# Patient Record
Sex: MALE | Race: WHITE
[De-identification: names, ages, dates, MRNs, and addresses within clinical notes are randomized per-mention and may not be internally consistent; named-entity substitution may affect disease eponyms.]

---

## 2018-10-12 ENCOUNTER — HOSPITAL ENCOUNTER (EMERGENCY)
Dept: HOSPITAL 62 - ER | Age: 38
LOS: 1 days | Discharge: HOME | End: 2018-10-13
Payer: COMMERCIAL

## 2018-10-12 DIAGNOSIS — R07.89: Primary | ICD-10-CM

## 2018-10-12 PROCEDURE — 82553 CREATINE MB FRACTION: CPT

## 2018-10-12 PROCEDURE — 82550 ASSAY OF CK (CPK): CPT

## 2018-10-12 PROCEDURE — 71045 X-RAY EXAM CHEST 1 VIEW: CPT

## 2018-10-12 PROCEDURE — 36415 COLL VENOUS BLD VENIPUNCTURE: CPT

## 2018-10-12 PROCEDURE — 84484 ASSAY OF TROPONIN QUANT: CPT

## 2018-10-12 PROCEDURE — 99285 EMERGENCY DEPT VISIT HI MDM: CPT

## 2018-10-12 PROCEDURE — 93010 ELECTROCARDIOGRAM REPORT: CPT

## 2018-10-12 PROCEDURE — 93005 ELECTROCARDIOGRAM TRACING: CPT

## 2018-10-12 PROCEDURE — 80053 COMPREHEN METABOLIC PANEL: CPT

## 2018-10-12 PROCEDURE — 85025 COMPLETE CBC W/AUTO DIFF WBC: CPT

## 2018-10-13 VITALS — SYSTOLIC BLOOD PRESSURE: 108 MMHG | DIASTOLIC BLOOD PRESSURE: 79 MMHG

## 2018-10-13 LAB
ADD MANUAL DIFF: NO
ALBUMIN SERPL-MCNC: 4.5 G/DL (ref 3.5–5)
ALP SERPL-CCNC: 60 U/L (ref 38–126)
ALT SERPL-CCNC: 59 U/L (ref 21–72)
ANION GAP SERPL CALC-SCNC: 11 MMOL/L (ref 5–19)
AST SERPL-CCNC: 37 U/L (ref 17–59)
BASOPHILS # BLD AUTO: 0 10^3/UL (ref 0–0.2)
BASOPHILS NFR BLD AUTO: 0.6 % (ref 0–2)
BILIRUB DIRECT SERPL-MCNC: 0.3 MG/DL (ref 0–0.4)
BILIRUB SERPL-MCNC: 0.6 MG/DL (ref 0.2–1.3)
BUN SERPL-MCNC: 13 MG/DL (ref 7–20)
CALCIUM: 9.6 MG/DL (ref 8.4–10.2)
CHLORIDE SERPL-SCNC: 106 MMOL/L (ref 98–107)
CK MB SERPL-MCNC: 0.55 NG/ML (ref ?–4.55)
CK SERPL-CCNC: 80 U/L (ref 55–170)
CO2 SERPL-SCNC: 23 MMOL/L (ref 22–30)
EOSINOPHIL # BLD AUTO: 0.1 10^3/UL (ref 0–0.6)
EOSINOPHIL NFR BLD AUTO: 2.2 % (ref 0–6)
ERYTHROCYTE [DISTWIDTH] IN BLOOD BY AUTOMATED COUNT: 13 % (ref 11.5–14)
GLUCOSE SERPL-MCNC: 101 MG/DL (ref 75–110)
HCT VFR BLD CALC: 46.1 % (ref 37.9–51)
HGB BLD-MCNC: 16.4 G/DL (ref 13.5–17)
LYMPHOCYTES # BLD AUTO: 1.7 10^3/UL (ref 0.5–4.7)
LYMPHOCYTES NFR BLD AUTO: 27.8 % (ref 13–45)
MCH RBC QN AUTO: 30.4 PG (ref 27–33.4)
MCHC RBC AUTO-ENTMCNC: 35.6 G/DL (ref 32–36)
MCV RBC AUTO: 85 FL (ref 80–97)
MONOCYTES # BLD AUTO: 0.6 10^3/UL (ref 0.1–1.4)
MONOCYTES NFR BLD AUTO: 10.4 % (ref 3–13)
NEUTROPHILS # BLD AUTO: 3.6 10^3/UL (ref 1.7–8.2)
NEUTS SEG NFR BLD AUTO: 59 % (ref 42–78)
PLATELET # BLD: 261 10^3/UL (ref 150–450)
POTASSIUM SERPL-SCNC: 4.1 MMOL/L (ref 3.6–5)
PROT SERPL-MCNC: 7.4 G/DL (ref 6.3–8.2)
RBC # BLD AUTO: 5.4 10^6/UL (ref 4.35–5.55)
SODIUM SERPL-SCNC: 140.3 MMOL/L (ref 137–145)
TOTAL CELLS COUNTED % (AUTO): 100 %
TROPONIN I SERPL-MCNC: < 0.012 NG/ML
WBC # BLD AUTO: 6.1 10^3/UL (ref 4–10.5)

## 2018-10-13 NOTE — RADIOLOGY REPORT (SQ)
EXAM DESCRIPTION: 



XR CHEST 1 VIEW



COMPLETED DATE/TME:  10/13/2018 00:11



CLINICAL HISTORY: chest pain



COMPARISON: None.



FINDINGS: Single frontal view of the chest.  The

cardiomediastinal silhouette has normal size and contour. No

consolidation, pneumothorax, or pleural effusion.  Leads overlie

the chest. Postoperative change of the distal clavicle.  Upper

abdominal soft tissues are unremarkable.



IMPRESSION:



1. No acute pulmonary process identified.

## 2018-10-13 NOTE — ER DOCUMENT REPORT
ED General





- General


Chief Complaint: Chest Pain


Stated Complaint: CHEST PAINS


Time Seen by Provider: 10/12/18 23:58


Mode of Arrival: Ambulatory


Information source: Patient


Notes: 





Patient is a 37-year-old male who presents with chief complaint of chest 

pressure.  Patient reports the pain started this morning, is located at the 

bottom of his sternum and feels like a squeezing sensation.  Patient denies any 

shortness of breath, nausea, radiation of the pain/pressure and denies any 

diaphoresis.  Denies any alleviating or exacerbating symptoms.  Denies any 

cardiac history.  Patient is not a smoker.  Patient does report that he 

recently started working out at the gym approximately 3 weeks ago other than 

that patient has had no change in his daily routines.








TRAVEL OUTSIDE OF THE [place] IN LAST 30 DAYS: No





- Related Data


Allergies/Adverse Reactions: 


 





No Known Allergies Allergy (Verified 10/12/18 21:34)


 











Past Medical History





- General


Information source: Patient





- Social History


Smoking Status: Never Smoker


Chew tobacco use (# tins/day): No


Frequency of alcohol use: Social


Drug Abuse: None


Family History: Reviewed & Not Pertinent


Patient has suicidal ideation: No


Patient has homicidal ideation: No


Renal/ Medical History: Denies: Hx Peritoneal Dialysis


GI Medical History: Reports: Hx Ulcer


Past Surgical History: Reports: Hx Appendectomy, Hx Tonsillectomy





- Immunizations


Immunizations up to date: Yes





Review of Systems





- Review of Systems


Cardiovascular: Chest pain


-: Yes All other systems reviewed and negative





Physical Exam





- Vital signs


Vitals: 


 











Temp Pulse Resp BP Pulse Ox


 


 98.6 F   81   20   132/93 H  94 


 


 10/12/18 21:52  10/12/18 21:52  10/12/18 21:52  10/12/18 21:52  10/12/18 21:52














- Notes


Notes: 





PHYSICAL EXAMINATION:





GENERAL: Well-appearing, well-nourished and in no acute distress.





HEAD: Atraumatic, normocephalic.





EYES: Pupils equal round and reactive to light, extraocular movements intact, 

sclera anicteric, conjunctiva are normal.





ENT: Nares patent, oropharynx clear without exudates.  Moist mucous membranes.





NECK: Normal range of motion, supple without lymphadenopathy





LUNGS: Breath sounds clear to auscultation bilaterally and equal.  No wheezes 

rales or rhonchi.





HEART: Regular rate and rhythm without murmurs





ABDOMEN: Soft, nontender, nondistended abdomen.  No guarding, no rebound.  No 

masses appreciated.





Musculoskeletal: Normal range of motion, no pitting or edema.  No cyanosis.  

Tenderness to palpation of the lower tip of the sternum.





NEUROLOGICAL: Cranial nerves grossly intact.  Normal speech, normal gait.  

Normal sensory, motor exams 





PSYCH: Normal mood, normal affect.





SKIN: Warm, Dry, normal turgor, no rashes or lesions noted.





Course





- Re-evaluation


Re-evalutation: 





CBC, CMP and cardiac enzymes are negative.  EKG is a sinus rhythm, normal axis 

with no ST segment elevations or depressions.  Chest x-ray is unremarkable.  

Patient's symptoms are consistent with musculoskeletal strain versus GERD.  

Unlikely this is ACS due to negative troponin, heart score of 1, negative 

troponin despite pain starting over 12 hours ago.  Patient was given a GI 

cocktail and reports complete relief of his symptoms.  Patient requesting 

discharge home at this time.  Patient given ED return precautions.  Patient 

verbalizes understanding of same.





- Vital Signs


Vital signs: 


 











Temp Pulse Resp BP Pulse Ox


 


 98.6 F   81   16   108/79   96 


 


 10/12/18 21:52  10/12/18 21:52  10/13/18 03:01  10/13/18 03:01  10/13/18 03:01














- Laboratory


Result Diagrams: 


 10/12/18 23:17





 10/12/18 23:17





Discharge





- Discharge


Clinical Impression: 


Chest pain


Qualifiers:


 Chest pain type: unspecified Qualified Code(s): R07.9 - Chest pain, unspecified





Condition: Stable


Disposition: HOME, SELF-CARE


Additional Instructions: 


Chest Pain of Unclear Cause





   The exact cause of your chest pain isn't clear. Fortunately, there is no 

evidence of a dangerous medical condition.  Further testing may be required to 

find the source of the pain.


   Most often, we find that this pain is coming from the chest wall -- the 

muscles or rib joints in the chest.  But chest pain can come from the lung and 

lung lining, the esophagus, the heart valves or heart lining, and even the 

stomach or gallbladder.


   Rest.  Eat lightly until the pain is gone.  We may prescribe medicine for 

pain and inflammation.


   You should call the physician immediately if the pain radiates to the 

shoulder, jaw or arms; if you start to run a fever or develop a cough; or if 

you develop shortness of breath, or other new or alarming symptoms.





Chest Wall Pain





   Your chest pain has been diagnosed as coming from the chest wall.  This is 

often caused by straining the muscles or joints in the chest during physical 

activity, direct trauma, coughing, or vigorous vomiting.  Persons with 

arthritis are especially prone to this type of pain, due to inflammation of the 

cartilage joints near the breast bone.  Occasionally, no cause can be found.


   Rest from strenuous physical activity.  This kind of chest pain is usually 

made worse by movement of the chest.  Depending on the symptoms, we may 

prescribe medicine for pain, muscle relaxation, and antiinflammatory effects.


   If the pain is new, and seems to be due to muscle strain, cold packs can 

help. Otherwise, apply gentle warmth to the painful area for 15 minutes every 

hour or two.   


   You should contact the doctor immediately if things change.  Further 

evaluation is needed if you develop a fever or cough, if the nature of the pain 

changes, or if you become short of breath.





***Your EKG, chest x-ray and blood work done for a cardiac workup were all 

normal today.  The fact that the GI cocktail helped the discomfort was 

reassuring.  You may apply moist heat to the area you may also alternate with 

ice.  Take it easy as you start to work out again this could be caused by over 

stretching of the muscles that you have not used for some time.  Return to the 

emergency department if you develop worsening chest pain, shortness of breath, 

nausea, diaphoresis or any other symptom that is concerning to you.  We are 

more than happy to reevaluate you.  I suggest following up with primary care in 

the next 3-5 days for a follow-up, return sooner if worsening.***


Forms:  Return to Work

## 2019-04-02 PROBLEM — K21.00 GASTROESOPHAGEAL REFLUX DISEASE WITH ESOPHAGITIS: Status: ACTIVE | Noted: 2019-04-02

## 2019-04-02 PROBLEM — E66.01 SEVERE OBESITY (HCC): Status: ACTIVE | Noted: 2019-04-02

## 2019-04-03 PROBLEM — F41.1 GAD (GENERALIZED ANXIETY DISORDER): Status: ACTIVE | Noted: 2019-04-03

## 2019-04-03 PROBLEM — F51.04 PSYCHOPHYSIOLOGICAL INSOMNIA: Status: ACTIVE | Noted: 2019-04-03

## 2019-04-10 ENCOUNTER — HOSPITAL ENCOUNTER (OUTPATIENT)
Dept: GENERAL RADIOLOGY | Age: 39
Discharge: HOME | End: 2019-04-10

## 2019-04-10 DIAGNOSIS — R10.813: ICD-10-CM

## 2019-04-18 NOTE — PROGRESS NOTES
Leigh Ann Bill : 1980 Payor: Henrique Payton / Plan: [facility] / Product Type: PPO /  [address] at [facility] [address] [place] [place] [address], [facility], [address] [place] [address] [phone]   [phone] OUTPATIENT PHYSICAL THERAPY:Initial Assessment 2019 ICD-10: Treatment Diagnosis: Low back pain (M54.5) Right lower quadrant abdominal tenderness without rebound tenderness [R10.813] Precautions/Allergies:  
Patient has no known allergies. MD Orders: Eval and Treat MEDICAL/REFERRING DIAGNOSIS: 
Right lower quadrant abdominal tenderness without rebound tenderness [R10.813] DATE OF ONSET: 2019 REFERRING PHYSICIAN: Peggie Fleischer, MD 
RETURN PHYSICIAN APPOINTMENT: 19 INITIAL ASSESSMENT:  Mr. Sandee Velasquez presents with c/o R lower quadrant pain that began ~2 months ago---comparable sign is extension and L side bending. Pain is worsened with pressure R LQ (PT performed deep pressure as well as iliopsoas release-no relief)  No tenderness L/S or T/S segements in prone. He did have anterior rotation right innominate that corrected with MET. Hip adduction strongly increased pain also. Pain could be possible hip flexor or  quadratus lumborum strain---I do believe further imaging may be necessary--Denilson Velasquez sees MD next week. Symptoms seem very hernia-like. Leigh Ann Bill will benefit from skilled PT (medically necessary) to address above deficits affecting participation in basic ADLs and overall functional tolerance. Manual techniques (stretching, joint mobilizations, soft tissue mobilization/myofascial release), postural exercises/education, therapeutic techniques/activities, and HEP will be performed as appropriate addressing Farzana Cortes's current condition. PROBLEM LIST (Impacting functional limitations): 1. Decreased Strength 2. Decreased ADL/Functional Activities 3. Decreased Transfer Abilities 4. Decreased Ambulation Ability/Technique 5. Decreased Balance 6. Increased Pain 7. Decreased Activity Tolerance 8. Decreased Eddy with Home Exercise Program INTERVENTIONS PLANNED: (Treatment may consist of any combination of the following) 1. Balance Exercise 2. Cold 3. Family Education 4. Gait Training 5. Home Exercise Program (HEP) 6. Manual Therapy 7. Neuromuscular Re-education/Strengthening 8. Range of Motion (ROM) 9. Therapeutic Activites 10. Therapeutic Exercise/Strengthening 11. Transcutaneous Electrical Nerve Stimulation (TENS) 12. Ultrasound (US)  
TREATMENT PLAN: 
Effective Dates: 4/19/2019 TO 7/17/2019 (90 days). Frequency/Duration: 2 times a week for 90 Day(s) GOALS: (Goals have been discussed and agreed upon with patient.) Short Term Goals 4 weeks 1. Vineet Betancur will be independent with HEP 2. Vineet Betancur will participate in LE stretching program to increase flexibility 3. Vineet Betanucr will be able to mountain bike without restrictions and no onset of pain. [address] Ann Cole will be able to return to gym with no pain and ability to perform dead lift without compensating. [address] Ann Cole will be able to ambulate and perform squat without pain and good technique. 6. Vineet Betancur will tolerate manual therapy/joint mobilizations/soft tissue to increase ROM and decrease pain 7. Vineet Betancur will demonstrate a 10 point improvement on the Oswestry to show improvement in function OUTCOME MEASURE:  
Tool Used: Modified Oswestry Low Back Pain Questionnaire Score:  Initial: *9/50  Most Recent: X/50 (Date: -- ) Interpretation of Score: Each section is scored on a 0-5 scale, 5 representing the greatest disability. The scores of each section are added together for a total score of 50.    
 
MEDICAL NECESSITY:  
· Skilled intervention continues to be required due to above signs and symptoms affecting Adwoa Cortes's ability to participate in ADLs and overall QOL. REASON FOR SERVICES/OTHER COMMENTS: 
· Patient continues to require skilled intervention due to patient unable to attend/participate in therapy as expected · . Total Duration: PT Patient Time In/Time Out Time In: 0845 Time Out: 0930 Rehabilitation Potential For Stated Goals: Good Regarding Denilson Cortes's therapy, I certify that the treatment plan above will be carried out by a therapist or under their direction. Thank you for this referral, 
Estela Molina DPT Referring Physician Signature: Sam Mcmahan MD _______________________________ Date _____________ PAIN/SUBJECTIVE:  
Initial:   *1/10 Post Session:  6/10 HISTORY:  
History of Injury/Illness (Reason for Referral): *Pain to inguinal area that has fluctuated. He rides his bike and walks for exercise. Pain is fine unless he's climbing. Per Chart Review:  RLQ pain- for 1.5 months now, constant, no bulges, no pain to push. No precipitating factors, says he moved his sister out in a hurry about 1.5 months ago, thinks it started after that. Today pain is 3/10, worst in last 2 weeks is 6-7/10, pain free at times - especially on waking up in am, or on laying down Past Medical History/Comorbidities:  
Mr. Dian Edwards  has a past medical history of GERD (gastroesophageal reflux disease). He also has no past medical history of Difficult intubation, Malignant hyperthermia due to anesthesia, Nausea & vomiting, Pseudocholinesterase deficiency, or Unspecified adverse effect of anesthesia. Mr. Dian Edwards  has a past surgical history that includes hx orthopaedic; hx tonsillectomy; and hx orthopaedic (Left). Active Ambulatory Problems Diagnosis Date Noted  Severe obesity ([facility] [address] 04/02/2019  Gastroesophageal reflux disease with esophagitis 04/02/2019  MADDY (generalized anxiety disorder) 04/03/2019  Psychophysiological insomnia 04/03/2019 Resolved Ambulatory Problems Diagnosis Date Noted  No Resolved Ambulatory Problems Past Medical History:  
Diagnosis Date  GERD (gastroesophageal reflux disease) Social History/Living Environment:  
  . Social History Socioeconomic History  Marital status: SINGLE Spouse name: Not on file  Number of children: Not on file  Years of education: Not on file  Highest education level: Not on file Occupational History  Not on file Social Needs  Financial resource strain: Not on file  Food insecurity:  
  Worry: Not on file Inability: Not on file  Transportation needs:  
  Medical: Not on file Non-medical: Not on file Tobacco Use  Smoking status: Never Smoker  Smokeless tobacco: Never Used Substance and Sexual Activity  Alcohol use: Yes Comment: 12  
 Drug use: No  
 Sexual activity: Yes  
  Partners: Female Birth control/protection: None Lifestyle  Physical activity:  
  Days per week: Not on file Minutes per session: Not on file  Stress: Not on file Relationships  Social connections:  
  Talks on phone: Not on file Gets together: Not on file Attends Evangelical service: Not on file Active member of club or organization: Not on file Attends meetings of clubs or organizations: Not on file Relationship status: Not on file  Intimate partner violence:  
  Fear of current or ex partner: Not on file Emotionally abused: Not on file Physically abused: Not on file Forced sexual activity: Not on file Other Topics Concern  Not on file Social History Narrative  Not on file Prior Level of Function/Work/Activity: 
*Independent Personal Factors:   
      Sex:  male Age:  43 y.o. Ambulatory/Rehab Services H2 Model Falls Risk Assessment Risk Factors: 
     (1)  Gender [Male] Ability to Rise from Chair: 
     (0)  Ability to rise in a single movement Falls Prevention Plan: No modifications necessary Total: (5 or greater = High Risk): 1 ©2010 [facility] [address] [place] [address] States Patent #[id number]. Federal Law prohibits the replication, distribution or use without written permission from [facility] Current Medications:   
  
Current Outpatient Medications:  
  omeprazole (PRILOSEC) 40 mg capsule, Take 1 Cap by mouth daily. , Disp: 30 Cap, Rfl: 0 
  raNITIdine (ZANTAC) 300 mg tab, Take 1 Tab by mouth daily. , Disp: 30 Tab, Rfl: 0 
  traZODone (DESYREL) 50 mg tablet, Take 1 Tab by mouth nightly., Disp: 30 Tab, Rfl: 0 
  multivitamin (ONE A DAY) tablet, Take 1 Tab by mouth daily. , Disp: , Rfl:   
Date Last Reviewed:  4/19/2019 Number of Personal Factors/Comorbidities that affect the Plan of Care: 0: LOW COMPLEXITY EXAMINATION:  
Observation/Orthostatic Postural Assessment:   
      Athletic, strong build Palpation:   
      R inguinal area and  
 
Special Tests: SOILA= Negative SLR (<60 degrees)=Negative SLUMP=Negative SI Joint Tests: Negative [place], SOILA, Thigh Thrust, ASIS Distraction, Sacral Compression Lower Quarter Screen Eval Date: 4/19/19 Re-Assess Date: Re-Assess Date: Active ROM of Lumbar Spine  RIGHT LEFT RIGHT LEFT RIGHT LEFT Flexion 100% no pain Extension STRETCHING PAIN 100%  (stretching pain) Side flexion 100% PAIN 100% WITH STRETCHING PAIN Rotation 100% 100% Deep Squat (for general function) 100% Single Leg Stance (Bilateral) or Sidelying Hip Abduction (L5, S1) No issues No issues Toe Walking (S1) No issues Heel walking (L4,5) No issues MMT Resisted Hip Flexion (L1,2) 5/5  5/5 Resisted Knee Extension or Unilateral Sit to Stand (L3,4) 5/5 5/5 Great Toe Extension (L5) Sensory Examination (EYES SHUT; dermatomes, use cotton ball on bare skin; L2 top of thigh, L3 Medial Knee; L4 Lateral Thigh, L5 Web space of great toe, S1 outside of foot, S2 inside of foot) Normal Normal      
Reflex testing (0, 1+, 2+, 3+, 4+) Patella Achilles Hip Abduction 5/5 5/5 ROM Hip flexion Upper Motor Neuron Test if needed (Villas, Babinski, Clonus, Supra Patella, Inverted Supinator Sign) - - Neurological Screen:  
 RADIATING SYMPTOMS?: YES/NO*-None Functional Mobility:  Affecting participation in basic ADLs and functional tasks. Balance: No issues Body Structures Involved: 1. Nerves 2. Bones 3. Joints 4. Muscles 5. Ligaments Body Functions Affected: 1. Sensory/Pain 2. Cardio 3. Neuromusculoskeletal 
4. Movement Related 5. Skin Related Activities and Participation Affected: 1. Learning and Applying Knowledge 2. General Tasks and Demands 3. Communication 4. Mobility 5. Self Care Number of elements (examined above) that affect the Plan of Care: 4+: HIGH COMPLEXITY CLINICAL PRESENTATION:  
Presentation: Stable and uncomplicated: LOW COMPLEXITY CLINICAL DECISION MAKING:  
Use of outcome tool(s) and clinical judgement create a POC that gives a: Questionable prediction of patient's progress: MODERATE COMPLEXITY Jared Lozano DPT Future Appointments Date Time Provider Afua Mendes 4/24/2019 10:30 AM MD PAMELA Sanders FVP FVP

## 2019-04-19 ENCOUNTER — HOSPITAL ENCOUNTER (OUTPATIENT)
Dept: PHYSICAL THERAPY | Age: 39
Discharge: HOME | End: 2019-04-19
Attending: FAMILY MEDICINE
Payer: COMMERCIAL

## 2019-04-19 DIAGNOSIS — R10.813 RIGHT LOWER QUADRANT ABDOMINAL TENDERNESS WITHOUT REBOUND TENDERNESS: ICD-10-CM

## 2019-04-19 PROCEDURE — 97140 MANUAL THERAPY 1/> REGIONS: CPT

## 2019-04-19 PROCEDURE — 97110 THERAPEUTIC EXERCISES: CPT

## 2019-04-19 PROCEDURE — 97164 PT RE-EVAL EST PLAN CARE: CPT

## 2019-04-19 NOTE — PROGRESS NOTES
Consuelo Cunningham : 1980 Payor: Kriss Hardy / Plan: [facility] / Product Type: PPO /  Aristeo Angel at [facility] [address] [facility], [address] [place] [address], [facility], [address] [place] [address] [phone]   [phone] Michael Arce MD 
 
 
OUTPATIENT PHYSICAL THERAPY: Daily Treatment Note 2019 Visit Count:  1 Pre-treatment Symptoms/Complaints:  Abdominal pain in lower quadrant Pain: Initial:   1-2 Post Session:  2/10 Medications Last Reviewed:  2019 Updated Objective Findings:  Pain with SB L and extension. TREATMENT:  
THERAPEUTIC EXERCISE: (*- minutes):  Exercises per grid below to improve mobility, strength and balance. Required minimal visual, verbal and manual cues to promote proper body alignment and promote proper body posture. Progressed resistance and complexity of movement as indicated. Date: 
 Date: 
 Date: Activity/Exercise Parameters Parameters Parameters MANUAL THERAPY: (25 minutes): Joint mobilization and Soft tissue mobilization was utilized and necessary because of the patient's restricted joint motion and restricted motion of soft tissue. MET for atnerior rotation R innominate as well as iliopsoas release [employer] Portal 
Treatment/Session Summary: · Response to Treatment:  No increase in pain or adverse reactions · Communication/Consultation:  Faxed initial evaluation to MD 
· Equipment provided today:  HEP. Recommendations/Intent for next treatment session: Next visit will focus on comparable sign. . 
Treatment Plan of Care Effective Dates: 19 TO 2019 (90 days). Frequency/Duration: 2 times a week for 90 Days Total Treatment Billable Duration:  25 Rx PT Patient Time In/Time Out Time In: 0845 Time Out: 1230 Constantine Zelaya DPT Future Appointments Date Time Provider Afua Mendes 4/24/2019 10:30 AM Jacquelin Puri MD SSA FVP FVP

## 2019-05-20 NOTE — PROGRESS NOTES
Kami Gil : 1980 Payor: Jasson Nelson / Plan: [facility] / Product Type: PPO /  [address] at [facility] [address] [facility], [address] [place] [address], [facility], [address] [place] [address] [phone]   [phone] OUTPATIENT PHYSICAL THERAPY:Discharge Summary 2019 ICD-10: Treatment Diagnosis: Low back pain (M54.5) Right lower quadrant abdominal tenderness without rebound tenderness [R10.813] Precautions/Allergies:  
Patient has no known allergies. MD Orders: Eval and Treat MEDICAL/REFERRING DIAGNOSIS: 
Right lower quadrant abdominal tenderness without rebound tenderness [R10.813] DATE OF ONSET: 2019 REFERRING PHYSICIAN: Sam Mcmahan MD 
RETURN PHYSICIAN APPOINTMENT: 19 DISCHARGE ASSESSMENT: I have reached out to Kami Gil and reviewed the chart with regards to patient's decrease in pain and return to work. We will DC PT case at this time. Thank you for the referral.  I am happy his pain is better! Kami Gil seen in our office 19 for 1 visit. TREATMENT PLAN: 
Effective Dates: 2019 TO 2019 (90 days). Frequency/Duration: 2 times a week for 90 Day(s)  NOT KNOWN AS Christine London ONLY 1 VISIT 
GOALS: (Goals have been discussed and agreed upon with patient.) Short Term Goals 4 weeks 1. Kami Gil will be independent with HEP 2. Kami Gil will participate in LE stretching program to increase flexibility 3. Kami Gil will be able to mountain bike without restrictions and no onset of pain. [address] [facility] Southestine Rater will be able to return to gym with no pain and ability to perform dead lift without compensating. [address] Florestine Rater will be able to ambulate and perform squat without pain and good technique. 6. Kami Gil will tolerate manual therapy/joint mobilizations/soft tissue to increase ROM and decrease pain [address] [facility] Seema Perez will demonstrate a 10 point improvement on the Oswestry to show improvement in function OUTCOME MEASURE:  
Tool Used: Modified Oswestry Low Back Pain Questionnaire Score:  Initial: *9/50  Most Recent: X/50 (Date: -- ) Interpretation of Score: Each section is scored on a 0-5 scale, 5 representing the greatest disability. The scores of each section are added together for a total score of 50.    
 
 
  
 
  
   
CHARLENE SanT